# Patient Record
Sex: MALE | Race: WHITE | NOT HISPANIC OR LATINO | Employment: FULL TIME | URBAN - METROPOLITAN AREA
[De-identification: names, ages, dates, MRNs, and addresses within clinical notes are randomized per-mention and may not be internally consistent; named-entity substitution may affect disease eponyms.]

---

## 2023-10-18 DIAGNOSIS — Z00.00 LABORATORY TESTS ORDERED AS PART OF A COMPLETE PHYSICAL EXAM (CPE): ICD-10-CM

## 2023-11-16 ENCOUNTER — OFFICE VISIT (OUTPATIENT)
Dept: OPHTHALMOLOGY | Facility: CLINIC | Age: 63
End: 2023-11-16
Payer: COMMERCIAL

## 2023-11-16 DIAGNOSIS — H33.052 OLD RETINAL DETACHMENT, TOTAL OR SUBTOTAL, LEFT: ICD-10-CM

## 2023-11-16 DIAGNOSIS — H35.372 EPIRETINAL MEMBRANE (ERM) OF LEFT EYE: ICD-10-CM

## 2023-11-16 DIAGNOSIS — H40.1131 PRIMARY OPEN-ANGLE GLAUCOMA, BILATERAL, MILD STAGE: Primary | ICD-10-CM

## 2023-11-16 DIAGNOSIS — Z96.1 PSEUDOPHAKIA OF BOTH EYES: ICD-10-CM

## 2023-11-16 PROCEDURE — 99213 OFFICE O/P EST LOW 20 MIN: CPT | Performed by: OPHTHALMOLOGY

## 2023-11-16 ASSESSMENT — EXTERNAL EXAM - LEFT EYE: OS_EXAM: NORMAL

## 2023-11-16 ASSESSMENT — CONF VISUAL FIELD
OD_NORMAL: 1
OS_NORMAL: 1
OD_SUPERIOR_TEMPORAL_RESTRICTION: 0
OD_INFERIOR_TEMPORAL_RESTRICTION: 0
OD_INFERIOR_NASAL_RESTRICTION: 0
OS_INFERIOR_NASAL_RESTRICTION: 0
OD_SUPERIOR_NASAL_RESTRICTION: 0
OS_INFERIOR_TEMPORAL_RESTRICTION: 0
OS_SUPERIOR_TEMPORAL_RESTRICTION: 0
OS_SUPERIOR_NASAL_RESTRICTION: 0

## 2023-11-16 ASSESSMENT — VISUAL ACUITY
OS_PH_SC: 20/70
OS_SC: 20/200
METHOD: SNELLEN - LINEAR
OD_SC: 20/20

## 2023-11-16 ASSESSMENT — SLIT LAMP EXAM - LIDS
COMMENTS: NORMAL
COMMENTS: NORMAL

## 2023-11-16 ASSESSMENT — ENCOUNTER SYMPTOMS: EYES NEGATIVE: 1

## 2023-11-16 ASSESSMENT — CUP TO DISC RATIO
OD_RATIO: 0.85
OS_RATIO: 0.9

## 2023-11-16 ASSESSMENT — TONOMETRY
IOP_METHOD: GOLDMANN APPLANATION
OS_IOP_MMHG: 10
OD_IOP_MMHG: 10

## 2023-11-16 ASSESSMENT — PACHYMETRY
OD_CT(UM): 559
OS_CT(UM): 599

## 2023-11-16 ASSESSMENT — EXTERNAL EXAM - RIGHT EYE: OD_EXAM: NORMAL

## 2023-11-16 NOTE — PROGRESS NOTES
Visual Acuity (Snellen - Linear)         Right Left    Dist sc 20/20 20/200    Dist ph sc  20/70          Tonometry       Tonometry (Goldmann Applanation, 8:58 AM)         Right Left    Pressure 10 10                  Assessment/Plan   Last dilated:  7/20/23    1.  Open-Angle Glaucoma OU:  /599  Tm 35/36.  Pt with 38 year history of glaucoma.  Pt had SLT OU with Dr. Galeana IOPs went higher.  I believed that IOP needed to be lower.  Brimonidine -> no effect, but rhopressa brought IOPs down from 15-17 mmHg down to 11 mmHg.  IOP is well controlled     Plan:  cont latanoprost OU QHS            cont dorzolamide/timolol OU BID            cont rhopressa OS QHS             f/u 4 month     2.  h/o retinal detachment OS:  ~ age 42 yo - occurred 1 day after cataract surgery     Plan:  monitor    3.  Epiretinal Membrane with SWR OS: s/p SBP OS.  likely contributory to patients symptoms, but I feel it is more glaucoma field loss that he is noticing.     Plan:  as per Dr. Louis     4.  Pseudophakia (PCIOL OD) / sulcus IOL OS:  no visually significant PCO     Plan:  monitor

## 2023-12-28 ENCOUNTER — LAB (OUTPATIENT)
Dept: LAB | Facility: LAB | Age: 63
End: 2023-12-28
Payer: COMMERCIAL

## 2023-12-28 DIAGNOSIS — Z00.00 LABORATORY TESTS ORDERED AS PART OF A COMPLETE PHYSICAL EXAM (CPE): ICD-10-CM

## 2023-12-28 LAB
25(OH)D3 SERPL-MCNC: 49 NG/ML (ref 30–100)
ALT SERPL W P-5'-P-CCNC: 59 U/L (ref 10–52)
ANION GAP SERPL CALC-SCNC: 13 MMOL/L (ref 10–20)
APPEARANCE UR: ABNORMAL
BILIRUB UR STRIP.AUTO-MCNC: NEGATIVE MG/DL
BUN SERPL-MCNC: 14 MG/DL (ref 6–23)
CALCIUM SERPL-MCNC: 10 MG/DL (ref 8.6–10.6)
CHLORIDE SERPL-SCNC: 105 MMOL/L (ref 98–107)
CHOLEST SERPL-MCNC: 159 MG/DL (ref 0–199)
CHOLESTEROL/HDL RATIO: 4.6
CO2 SERPL-SCNC: 26 MMOL/L (ref 21–32)
COLOR UR: ABNORMAL
CREAT SERPL-MCNC: 1.29 MG/DL (ref 0.5–1.3)
ERYTHROCYTE [DISTWIDTH] IN BLOOD BY AUTOMATED COUNT: 12.5 % (ref 11.5–14.5)
EST. AVERAGE GLUCOSE BLD GHB EST-MCNC: 126 MG/DL
GFR SERPL CREATININE-BSD FRML MDRD: 63 ML/MIN/1.73M*2
GLUCOSE SERPL-MCNC: 95 MG/DL (ref 74–99)
GLUCOSE UR STRIP.AUTO-MCNC: NEGATIVE MG/DL
HBA1C MFR BLD: 6 %
HCT VFR BLD AUTO: 47.5 % (ref 41–52)
HDLC SERPL-MCNC: 34.9 MG/DL
HGB BLD-MCNC: 15.8 G/DL (ref 13.5–17.5)
KETONES UR STRIP.AUTO-MCNC: NEGATIVE MG/DL
LDLC SERPL CALC-MCNC: 98 MG/DL
LEUKOCYTE ESTERASE UR QL STRIP.AUTO: NEGATIVE
MCH RBC QN AUTO: 31.7 PG (ref 26–34)
MCHC RBC AUTO-ENTMCNC: 33.3 G/DL (ref 32–36)
MCV RBC AUTO: 95 FL (ref 80–100)
MUCOUS THREADS #/AREA URNS AUTO: ABNORMAL /LPF
NITRITE UR QL STRIP.AUTO: NEGATIVE
NON HDL CHOLESTEROL: 124 MG/DL (ref 0–149)
NRBC BLD-RTO: 0 /100 WBCS (ref 0–0)
PH UR STRIP.AUTO: 5 [PH]
PLATELET # BLD AUTO: 260 X10*3/UL (ref 150–450)
POTASSIUM SERPL-SCNC: 4.7 MMOL/L (ref 3.5–5.3)
PROT UR STRIP.AUTO-MCNC: ABNORMAL MG/DL
PSA SERPL-MCNC: 3.18 NG/ML
RBC # BLD AUTO: 4.99 X10*6/UL (ref 4.5–5.9)
RBC # UR STRIP.AUTO: NEGATIVE /UL
RBC #/AREA URNS AUTO: ABNORMAL /HPF
SODIUM SERPL-SCNC: 139 MMOL/L (ref 136–145)
SP GR UR STRIP.AUTO: 1.02
TRIGL SERPL-MCNC: 131 MG/DL (ref 0–149)
TSH SERPL-ACNC: 1.02 MIU/L (ref 0.44–3.98)
UROBILINOGEN UR STRIP.AUTO-MCNC: 2 MG/DL
VIT B12 SERPL-MCNC: 631 PG/ML (ref 211–911)
VLDL: 26 MG/DL (ref 0–40)
WBC # BLD AUTO: 7.8 X10*3/UL (ref 4.4–11.3)
WBC #/AREA URNS AUTO: ABNORMAL /HPF

## 2023-12-28 PROCEDURE — 81001 URINALYSIS AUTO W/SCOPE: CPT

## 2023-12-28 PROCEDURE — 83036 HEMOGLOBIN GLYCOSYLATED A1C: CPT

## 2023-12-28 PROCEDURE — 36415 COLL VENOUS BLD VENIPUNCTURE: CPT

## 2023-12-28 PROCEDURE — 82306 VITAMIN D 25 HYDROXY: CPT

## 2023-12-28 PROCEDURE — 82607 VITAMIN B-12: CPT

## 2023-12-28 PROCEDURE — 80061 LIPID PANEL: CPT

## 2023-12-28 PROCEDURE — 84460 ALANINE AMINO (ALT) (SGPT): CPT

## 2023-12-28 PROCEDURE — 84443 ASSAY THYROID STIM HORMONE: CPT

## 2023-12-28 PROCEDURE — 84153 ASSAY OF PSA TOTAL: CPT

## 2023-12-28 PROCEDURE — 85027 COMPLETE CBC AUTOMATED: CPT

## 2023-12-28 PROCEDURE — 80048 BASIC METABOLIC PNL TOTAL CA: CPT

## 2023-12-29 DIAGNOSIS — H40.1131 PRIMARY OPEN-ANGLE GLAUCOMA, BILATERAL, MILD STAGE: Primary | ICD-10-CM

## 2023-12-29 RX ORDER — DORZOLAMIDE HYDROCHLORIDE AND TIMOLOL MALEATE 20; 5 MG/ML; MG/ML
SOLUTION/ DROPS OPHTHALMIC
COMMUNITY
Start: 2018-01-17 | End: 2023-12-29 | Stop reason: SDUPTHER

## 2023-12-29 RX ORDER — DORZOLAMIDE HYDROCHLORIDE AND TIMOLOL MALEATE 20; 5 MG/ML; MG/ML
SOLUTION/ DROPS OPHTHALMIC
Qty: 10 ML | Refills: 11 | Status: SHIPPED | OUTPATIENT
Start: 2023-12-29

## 2024-01-05 ENCOUNTER — OFFICE VISIT (OUTPATIENT)
Dept: PRIMARY CARE | Facility: CLINIC | Age: 64
End: 2024-01-05
Payer: COMMERCIAL

## 2024-01-05 VITALS
SYSTOLIC BLOOD PRESSURE: 122 MMHG | BODY MASS INDEX: 25.93 KG/M2 | OXYGEN SATURATION: 96 % | WEIGHT: 202 LBS | HEART RATE: 87 BPM | RESPIRATION RATE: 16 BRPM | HEIGHT: 74 IN | DIASTOLIC BLOOD PRESSURE: 88 MMHG | TEMPERATURE: 97.8 F

## 2024-01-05 DIAGNOSIS — Z00.00 WELL ADULT EXAM: Primary | ICD-10-CM

## 2024-01-05 PROCEDURE — 93000 ELECTROCARDIOGRAM COMPLETE: CPT | Performed by: INTERNAL MEDICINE

## 2024-01-05 PROCEDURE — 99396 PREV VISIT EST AGE 40-64: CPT | Performed by: INTERNAL MEDICINE

## 2024-01-05 PROCEDURE — 1036F TOBACCO NON-USER: CPT | Performed by: INTERNAL MEDICINE

## 2024-01-05 RX ORDER — LISINOPRIL 20 MG/1
20 TABLET ORAL DAILY
COMMUNITY
Start: 2023-04-27 | End: 2024-05-20 | Stop reason: SDUPTHER

## 2024-01-05 RX ORDER — METOPROLOL SUCCINATE 25 MG/1
1 TABLET, EXTENDED RELEASE ORAL DAILY
COMMUNITY
Start: 2017-01-12 | End: 2024-05-14

## 2024-01-05 RX ORDER — LATANOPROST 50 UG/ML
1 SOLUTION/ DROPS OPHTHALMIC DAILY
COMMUNITY
Start: 2022-12-16

## 2024-01-05 RX ORDER — AMLODIPINE BESYLATE 5 MG/1
5 TABLET ORAL DAILY
COMMUNITY
Start: 2022-08-15 | End: 2024-05-14

## 2024-01-05 RX ORDER — NETARSUDIL 0.2 MG/ML
1 SOLUTION/ DROPS OPHTHALMIC; TOPICAL DAILY
COMMUNITY
Start: 2022-08-26

## 2024-01-05 RX ORDER — DICLOFENAC SODIUM 75 MG/1
75 TABLET, DELAYED RELEASE ORAL 2 TIMES DAILY
COMMUNITY
Start: 2014-12-13

## 2024-01-05 RX ORDER — ATORVASTATIN CALCIUM 40 MG/1
40 TABLET, FILM COATED ORAL DAILY
COMMUNITY
Start: 2017-08-02 | End: 2024-05-14

## 2024-01-05 NOTE — PROGRESS NOTES
"Subjective   Patient ID: Justo Bhagat is a 63 y.o. male who presents for Annual Exam (Physical exam, left hip surgery clearance).  In for preop. For L hip replacement.        Review of Systems   All other systems reviewed and are negative.      Objective   Physical Exam  Constitutional:       Appearance: Normal appearance.   HENT:      Head: Normocephalic and atraumatic.      Nose: Nose normal.   Cardiovascular:      Rate and Rhythm: Normal rate and regular rhythm.   Abdominal:      General: Abdomen is flat.      Palpations: Abdomen is soft.   Musculoskeletal:         General: Normal range of motion.      Cervical back: Normal range of motion.   Skin:     General: Skin is warm and dry.   Neurological:      Mental Status: He is alert.       /88   Pulse 87   Temp 36.6 °C (97.8 °F)   Resp 16   Ht 1.88 m (6' 2\")   Wt 91.6 kg (202 lb)   SpO2 96%   BMI 25.94 kg/m²         Assessment/Plan   Problem List Items Addressed This Visit    None  Visit Diagnoses       Well adult exam    -  Primary    Relevant Orders    ECG 12 Lead        ASSESSMENT AND PLAN: Patient on examination is in fair health except for medical conditions discussed above, obtained screening blood tests to screen for high cholesterol, diabetes, thyroid, Ekg if above 50 years old or earlier if with cardiac history. Patient should be taking enough calcium in a balanced diet  Weight control especially if BMI is above ideal range. For Female Patients Only:  Mammogram yearly and PAP test with gynecology unless s/p Total hysterectomy  Bone density test at age 60 and above and every two years after that. Preventive Medicine: colon cancer screening by age 45 if no family history as well PSA @ 50 , balanced diet, and exercise as discussed. Seat belt use for injury prevention, living will. Substance use and /or tobacco use counseled when applicable. Alcohol use discussed, use designated . Immunizations TD age 50 and every 10 years. Pneumovax and " shingles vaccine counseled. Yearly flu vaccine unless contraindicated. More than 50% of office visit time spent counseling the patient, questions were answered. If problems arise, patient is to call or come back in. It is understood that the responsibility of healthcare is shared by the patient by following a healthy lifestyle and following the plan above as discussed. Advised complete physical examination every year. Pending additional results, may need to come for a return office visit for follow-up.       Proceed with surgery.

## 2024-05-14 DIAGNOSIS — E78.5 HYPERLIPIDEMIA, UNSPECIFIED: ICD-10-CM

## 2024-05-14 DIAGNOSIS — I10 ESSENTIAL (PRIMARY) HYPERTENSION: ICD-10-CM

## 2024-05-14 RX ORDER — ATORVASTATIN CALCIUM 40 MG/1
40 TABLET, FILM COATED ORAL DAILY
Qty: 90 TABLET | Refills: 3 | Status: SHIPPED | OUTPATIENT
Start: 2024-05-14 | End: 2024-05-20 | Stop reason: SDUPTHER

## 2024-05-14 RX ORDER — METOPROLOL SUCCINATE 25 MG/1
25 TABLET, EXTENDED RELEASE ORAL DAILY
Qty: 90 TABLET | Refills: 3 | Status: SHIPPED | OUTPATIENT
Start: 2024-05-14 | End: 2024-05-20 | Stop reason: SDUPTHER

## 2024-05-14 RX ORDER — AMLODIPINE BESYLATE 5 MG/1
5 TABLET ORAL DAILY
Qty: 90 TABLET | Refills: 3 | Status: SHIPPED | OUTPATIENT
Start: 2024-05-14 | End: 2024-05-20 | Stop reason: SDUPTHER

## 2024-05-20 DIAGNOSIS — I10 ESSENTIAL (PRIMARY) HYPERTENSION: Primary | ICD-10-CM

## 2024-05-20 DIAGNOSIS — E78.5 HYPERLIPIDEMIA, UNSPECIFIED: ICD-10-CM

## 2024-05-20 RX ORDER — AMLODIPINE BESYLATE 5 MG/1
5 TABLET ORAL DAILY
Qty: 90 TABLET | Refills: 3 | Status: SHIPPED | OUTPATIENT
Start: 2024-05-20

## 2024-05-20 RX ORDER — LISINOPRIL 20 MG/1
20 TABLET ORAL DAILY
Qty: 90 TABLET | Refills: 3 | Status: SHIPPED | OUTPATIENT
Start: 2024-05-20 | End: 2025-05-20

## 2024-05-20 RX ORDER — ATORVASTATIN CALCIUM 40 MG/1
40 TABLET, FILM COATED ORAL DAILY
Qty: 90 TABLET | Refills: 3 | Status: SHIPPED | OUTPATIENT
Start: 2024-05-20

## 2024-05-20 RX ORDER — METOPROLOL SUCCINATE 25 MG/1
25 TABLET, EXTENDED RELEASE ORAL DAILY
Qty: 90 TABLET | Refills: 3 | Status: SHIPPED | OUTPATIENT
Start: 2024-05-20

## 2024-07-01 ENCOUNTER — APPOINTMENT (OUTPATIENT)
Dept: CARDIOLOGY | Facility: CLINIC | Age: 64
End: 2024-07-01
Payer: COMMERCIAL

## 2024-07-15 DIAGNOSIS — H40.1131 PRIMARY OPEN-ANGLE GLAUCOMA, BILATERAL, MILD STAGE: ICD-10-CM

## 2024-07-15 RX ORDER — LATANOPROST 50 UG/ML
1 SOLUTION/ DROPS OPHTHALMIC DAILY
Qty: 7.5 ML | Refills: 3 | Status: SHIPPED | OUTPATIENT
Start: 2024-07-15

## 2024-07-15 RX ORDER — NETARSUDIL 0.2 MG/ML
1 SOLUTION/ DROPS OPHTHALMIC; TOPICAL DAILY
Qty: 7.5 ML | Refills: 3 | Status: SHIPPED | OUTPATIENT
Start: 2024-07-15

## 2024-07-15 RX ORDER — DORZOLAMIDE HYDROCHLORIDE AND TIMOLOL MALEATE 20; 5 MG/ML; MG/ML
SOLUTION/ DROPS OPHTHALMIC
Qty: 10 ML | Refills: 11 | Status: SHIPPED | OUTPATIENT
Start: 2024-07-15

## 2024-08-01 ENCOUNTER — APPOINTMENT (OUTPATIENT)
Dept: OPHTHALMOLOGY | Facility: CLINIC | Age: 64
End: 2024-08-01
Payer: COMMERCIAL

## 2024-09-04 ENCOUNTER — APPOINTMENT (OUTPATIENT)
Dept: CARDIOLOGY | Facility: CLINIC | Age: 64
End: 2024-09-04
Payer: COMMERCIAL

## 2024-10-30 PROBLEM — I10 BENIGN ESSENTIAL HYPERTENSION: Status: ACTIVE | Noted: 2024-10-30

## 2024-10-30 PROBLEM — H40.1132 PRIMARY OPEN ANGLE GLAUCOMA OF BOTH EYES, MODERATE STAGE: Status: ACTIVE | Noted: 2018-01-17

## 2024-10-30 PROBLEM — E78.5 HYPERLIPIDEMIA: Status: ACTIVE | Noted: 2024-10-30

## 2024-10-30 PROBLEM — M16.9 OSTEOARTHRITIS OF HIP: Status: ACTIVE | Noted: 2022-06-09

## 2024-10-30 PROBLEM — I25.10 CALCIFICATION OF CORONARY ARTERY: Status: ACTIVE | Noted: 2024-10-30

## 2024-10-31 ENCOUNTER — APPOINTMENT (OUTPATIENT)
Dept: OPHTHALMOLOGY | Facility: CLINIC | Age: 64
End: 2024-10-31
Payer: COMMERCIAL

## 2024-10-31 DIAGNOSIS — H40.1131 PRIMARY OPEN-ANGLE GLAUCOMA, BILATERAL, MILD STAGE: Primary | ICD-10-CM

## 2024-10-31 DIAGNOSIS — H40.10X0 OPEN-ANGLE GLAUCOMA OF BOTH EYES, UNSPECIFIED GLAUCOMA STAGE, UNSPECIFIED OPEN-ANGLE GLAUCOMA TYPE: ICD-10-CM

## 2024-10-31 PROCEDURE — 92133 CPTRZD OPH DX IMG PST SGM ON: CPT | Performed by: OPHTHALMOLOGY

## 2024-10-31 PROCEDURE — 99214 OFFICE O/P EST MOD 30 MIN: CPT | Performed by: OPHTHALMOLOGY

## 2024-10-31 PROCEDURE — 92083 EXTENDED VISUAL FIELD XM: CPT | Performed by: OPHTHALMOLOGY

## 2024-10-31 ASSESSMENT — VISUAL ACUITY
OS_PH_SC+: +2
OS_SC: 20/100
OS_PH_SC: 20/60
METHOD: SNELLEN - LINEAR
OD_SC: 20/20

## 2024-10-31 ASSESSMENT — TONOMETRY
IOP_METHOD: GOLDMANN APPLANATION
OD_IOP_MMHG: 11
OS_IOP_MMHG: 12

## 2024-10-31 ASSESSMENT — SLIT LAMP EXAM - LIDS
COMMENTS: NORMAL
COMMENTS: NORMAL

## 2024-10-31 ASSESSMENT — CUP TO DISC RATIO
OS_RATIO: 0.9
OD_RATIO: 0.85

## 2024-10-31 ASSESSMENT — PACHYMETRY
OS_CT(UM): 599
OD_CT(UM): 559

## 2024-10-31 ASSESSMENT — EXTERNAL EXAM - LEFT EYE: OS_EXAM: NORMAL

## 2024-10-31 ASSESSMENT — CONF VISUAL FIELD
OS_SUPERIOR_TEMPORAL_RESTRICTION: 0
OS_INFERIOR_NASAL_RESTRICTION: 0
OS_NORMAL: 1
OS_SUPERIOR_NASAL_RESTRICTION: 0
OS_INFERIOR_TEMPORAL_RESTRICTION: 0

## 2024-10-31 ASSESSMENT — EXTERNAL EXAM - RIGHT EYE: OD_EXAM: NORMAL

## 2024-11-18 ENCOUNTER — APPOINTMENT (OUTPATIENT)
Dept: CARDIOLOGY | Facility: CLINIC | Age: 64
End: 2024-11-18
Payer: COMMERCIAL

## 2024-11-18 VITALS
SYSTOLIC BLOOD PRESSURE: 122 MMHG | BODY MASS INDEX: 25.55 KG/M2 | OXYGEN SATURATION: 94 % | DIASTOLIC BLOOD PRESSURE: 76 MMHG | WEIGHT: 199 LBS | HEART RATE: 59 BPM | RESPIRATION RATE: 16 BRPM

## 2024-11-18 DIAGNOSIS — R00.2 PALPITATIONS: ICD-10-CM

## 2024-11-18 DIAGNOSIS — I10 BENIGN ESSENTIAL HYPERTENSION: Primary | ICD-10-CM

## 2024-11-18 DIAGNOSIS — I25.10 CALCIFICATION OF CORONARY ARTERY: ICD-10-CM

## 2024-11-18 PROCEDURE — 93000 ELECTROCARDIOGRAM COMPLETE: CPT | Performed by: STUDENT IN AN ORGANIZED HEALTH CARE EDUCATION/TRAINING PROGRAM

## 2024-11-18 PROCEDURE — 3078F DIAST BP <80 MM HG: CPT | Performed by: STUDENT IN AN ORGANIZED HEALTH CARE EDUCATION/TRAINING PROGRAM

## 2024-11-18 PROCEDURE — 99213 OFFICE O/P EST LOW 20 MIN: CPT | Performed by: STUDENT IN AN ORGANIZED HEALTH CARE EDUCATION/TRAINING PROGRAM

## 2024-11-18 PROCEDURE — 3074F SYST BP LT 130 MM HG: CPT | Performed by: STUDENT IN AN ORGANIZED HEALTH CARE EDUCATION/TRAINING PROGRAM

## 2024-11-18 RX ORDER — NAPROXEN SODIUM 220 MG/1
81 TABLET, FILM COATED ORAL DAILY
Qty: 90 TABLET | Refills: 3 | Status: SHIPPED | OUTPATIENT
Start: 2024-11-18 | End: 2025-11-18

## 2024-11-18 NOTE — PROGRESS NOTES
Encounter Date: 2023     Signed             Diagnoses/Problems  Assessed    · CAD (coronary artery disease) (414.00) (I25.10)   · Benign essential HTN (401.1) (I10)   · Hypercholesterolemia (272.0) (E78.00)     Orders  Benign essential HTN    · Renew: amLODIPine Besylate 5 MG Oral Tablet; Take 1 tablet daily   · Renew: Lisinopril 20 MG Oral Tablet; TAKE 1 TABLET BY MOUTH DAILY   · Renew: Metoprolol Succinate ER 25 MG Oral Tablet Extended Release 24 Hour; TAKE 1  TABLET BY MOUTH DAILY  Benign essential HTN, CAD (coronary artery disease), Hypercholesterolemia    · Comprehensive Metabolic Panel; Status:Active - Retrospective By Protocol  Authorization; Requested for:2023;    · Lipid Panel; Status:Active - Retrospective By Protocol Authorization; Requested  for:2023;    · Magnesium, Serum; Status:Active - Retrospective By Protocol Authorization; Requested  for:2023;   CAD (coronary artery disease)    · IO EKG Electrocardiogram- 12 Lead; Status:Complete;   Done: 2023  HLD (hyperlipidemia)    · Renew: Atorvastatin Calcium 40 MG Oral Tablet; TAKE 1 TABLET DAILY     Chief Complaint        THIERRY GU is being seen for an annual follow-up of.      Patient present today for medication review.      History of Present IllnessMay 2020  Patient is a 59-year-old male who is here to establish care and had coronary artery calcium score done which came back elevated.     Patient ports not having chest pain or shortness of breath or palpitation. He has family history for coronary artery disease with his dad having MI at age 55 who eventually  when he was 76. Patient denies smoking history. He reports walking at least 4 times per week regularly. His EKG today shows normal sinus rhythm with a rate of 60 with no significant ST-T changes. His coronary artery calcium score that was done in 2020 showed total coronary artery calcium score of 264 which was all in LAD. This is consistent with moderate  increase in the level of coronary artery calcium score. Patient past medical history is also pertinent for hypertension and hyperlipidemia. He has not done recent lipid check for at least 2 years.     August 13, 2020  Patient underwent exercise a stress test on June 29, 2020. He was able to exercise to a stage IV Bert protocol for total of 10 minutes and 35 seconds and achieved 9.7 METS. Patient developed shortness of breath and fatigued during the stress test that is relieved by rest. He was also hypertensive during the stress test up to 172/91 in phase 1 recovery. Patient reached 83% of the age-predicted target heart rate and therefore nondiagnostic his stress test as he did not reached the target heart rate. No EKG evidence for ischemia at submaximal workload. The myocardial perfusion imaging part of the study did not show evidence of ischemia and ejection fraction was reported 62%. He had labs done in May 2020 that showed creatinine of 1, potassium 4.4, LDL 96, magnesium 2.5.     May 19, 2022  Patient denies having chest pain or shortness of breath. He currently reports having some palpitation and is keep eating especially when he has more activity or he is anxious. His EKG today shows sinus rhythm with rate of 70. No significant ST-T changes. He has stopped taking his baby aspirin because of what he heard in the news. His father had coronary artery bypass grafting in his 70s.        May 24, 2023  Denies having chest pain or shortness of breath or palpitation. He is not taking any ACE inhibitor at this point. EKG today shows sinus rhythm with rate of 75 with no significant ST-T changes. Weight is up about 6 pounds.  His labs from June 2022 showed potassium 4.3, creatinine 1.1, LDL 82.                    Follow up visit    11/18/24    Justo Bhagat is a 63 y.o. male who is here for annual follow-up  Had hip surgery and eye surgery.      Patient denies any chest pain, shortness of breath, palpitation.    EKG today  shows normal sinus rhythm with normal axis and no significant ST-T changes.  Heart rate at 59.      Past Medical History  Past Medical History:   Diagnosis Date    Atherosclerotic heart disease of native coronary artery without angina pectoris 05/19/2022    CAD (coronary artery disease)    Essential (primary) hypertension 05/19/2022    Benign essential HTN    Hyperlipidemia, unspecified 05/19/2022    HLD (hyperlipidemia)    Personal history of other medical treatment     History of nuclear stress test    Primary open-angle glaucoma, bilateral, moderate stage 12/19/2022    Primary open angle glaucoma of both eyes, moderate stage        Past Surgical History  Past Surgical History:   Procedure Laterality Date    HERNIA REPAIR  05/21/2020    Inguinal Hernia Repair    OTHER SURGICAL HISTORY  05/21/2020    Laminectomy Decompressive Up To Two Lumbar Segments    OTHER SURGICAL HISTORY  05/21/2020    Cataract surgery    TOTAL HIP ARTHROPLASTY  05/21/2020    Total Hip Replacement        Social history  Social History     Socioeconomic History    Marital status:      Spouse name: Not on file    Number of children: Not on file    Years of education: Not on file    Highest education level: Not on file   Occupational History    Not on file   Tobacco Use    Smoking status: Never     Passive exposure: Never    Smokeless tobacco: Never   Substance and Sexual Activity    Alcohol use: Never    Drug use: Never    Sexual activity: Not on file   Other Topics Concern    Not on file   Social History Narrative    Not on file     Social Drivers of Health     Financial Resource Strain: Not on file   Food Insecurity: Not on file   Transportation Needs: Not on file   Physical Activity: Not on file   Stress: Not on file   Social Connections: Not on file   Intimate Partner Violence: Not on file   Housing Stability: Not on file        Family History  No family history on file.     12 system point of review is negative except for what  "described in history of present illness    Allergies:  No Known Allergies     Outpatient Medications:  Current Outpatient Medications   Medication Instructions    amLODIPine (NORVASC) 5 mg, oral, Daily    atorvastatin (LIPITOR) 40 mg, oral, Daily    diclofenac (VOLTAREN) 75 mg, 2 times daily    dorzolamide-timoloL (Cosopt) 22.3-6.8 mg/mL ophthalmic solution INSTILL 1 DROP IN BOTH EYES TWICE DAILY    latanoprost (Xalatan) 0.005 % ophthalmic solution 1 drop, Both Eyes, Daily    lisinopril 20 mg, oral, Daily    metoprolol succinate XL (TOPROL-XL) 25 mg, oral, Daily    Rhopressa 0.02 % drops opthalmic solution 1 drop, Left Eye, Daily         Last Recorded Vitals:      5/21/2020    10:02 AM 8/13/2020    10:03 AM 5/19/2022     3:43 PM 5/24/2023     9:11 AM 1/5/2024     8:18 AM 11/18/2024    11:19 AM   Vitals   Systolic 126 130 126 142 122 122   Diastolic 90 77 96 86 88 76   Heart Rate 60 80 74 74 87 59   Temp     36.6 °C (97.8 °F)    Resp    16 16 16   Height (in) 1.88 m (6' 2\")  1.88 m (6' 2\") 1.88 m (6' 2\") 1.88 m (6' 2\")    Weight (lb) 193.25  202.5 208 202 199   BMI 24.81 kg/m2  26 kg/m2 26.71 kg/m2 25.94 kg/m2 25.55 kg/m2   BSA (m2) 2.14 m2  2.19 m2 2.22 m2 2.19 m2 2.17 m2   Visit Report     Report Report    Visit Vitals  /76   Pulse 59   Resp 16   Wt 90.3 kg (199 lb)   SpO2 94%   BMI 25.55 kg/m²   Smoking Status Never   BSA 2.17 m²        Physical Exam:    General: Awake, alert/oriented x3, well developed, no acute distress  Head: Atraumatic/Normocephalic  Eyes: Normal external exam, EOMI, PERRLA  ENT: Oropharynx normal, moist mucous membranes  Cardiovascular: RRR, S1/S2, no murmurs, rubs, or gallops, radial pulses +2, no edema of extremities  Pulmonary: CTAB, no respiratory distress. No wheezes, rales, or ronchi  Abdomen: +BS, soft, non-tender, nondistended, no guarding or rebound  MSK: No joint swelling, normal movements of all extremities. Range of motion- normal.  Extremities: no edema, no cyanosis  Neuro: " "Alert/oriented x3, no focal motor or sensory deficits  Psychiatric: Judgment intact. Appropriate mood and behavior      I reviewed recent available cardiac studies.      Labs    Lab Results   Component Value Date    WBC 7.8 12/28/2023    HGB 15.8 12/28/2023    HCT 47.5 12/28/2023     12/28/2023    CHOL 159 12/28/2023    TRIG 131 12/28/2023    HDL 34.9 12/28/2023    ALT 59 (H) 12/28/2023    AST 19 06/03/2022     12/28/2023    K 4.7 12/28/2023     12/28/2023    CREATININE 1.29 12/28/2023    BUN 14 12/28/2023    CO2 26 12/28/2023    TSH 1.02 12/28/2023    PSA 3.18 12/28/2023    HGBA1C 6.0 (H) 12/28/2023     No results found for: \"CKTOTAL\", \"CKMB\", \"CKMBINDEX\", \"TROPONINI\"     No results found for: \"INR\", \"PROTIME\"          Assessment/Plan     Patient overall is doing well from a cardiac standpoint.    We will continue with current medications.    Discussed the importance of heart healthy diet and exercise.    Follow-up in clinic in 1 year with prior labs including CMP, Mg, lipids and EKG at the time of visit.           Pete Fletcher MD, PhD, [facility], [facility]  Interventional Cardiology, [facility]  Associate Professor of Medicine, [facility]  Office: [phone]         **Disclaimer: This note was dictated by speech recognition, and every effort has been made to prevent any error in transcription, however minor errors may be present**    "

## 2025-03-06 ENCOUNTER — APPOINTMENT (OUTPATIENT)
Dept: OPHTHALMOLOGY | Facility: CLINIC | Age: 65
End: 2025-03-06
Payer: COMMERCIAL

## 2025-03-06 DIAGNOSIS — H40.1131 PRIMARY OPEN-ANGLE GLAUCOMA, BILATERAL, MILD STAGE: ICD-10-CM

## 2025-03-06 PROCEDURE — 92020 GONIOSCOPY: CPT | Performed by: OPHTHALMOLOGY

## 2025-03-06 PROCEDURE — 99213 OFFICE O/P EST LOW 20 MIN: CPT | Performed by: OPHTHALMOLOGY

## 2025-03-06 RX ORDER — DORZOLAMIDE HYDROCHLORIDE AND TIMOLOL MALEATE 20; 5 MG/ML; MG/ML
SOLUTION/ DROPS OPHTHALMIC
Qty: 10 ML | Refills: 11 | Status: SHIPPED | OUTPATIENT
Start: 2025-03-06

## 2025-03-06 RX ORDER — LATANOPROST 50 UG/ML
1 SOLUTION/ DROPS OPHTHALMIC DAILY
Qty: 7.5 ML | Refills: 3 | Status: SHIPPED | OUTPATIENT
Start: 2025-03-06

## 2025-03-06 RX ORDER — NETARSUDIL 0.2 MG/ML
1 SOLUTION/ DROPS OPHTHALMIC; TOPICAL DAILY
Qty: 7.5 ML | Refills: 3 | Status: SHIPPED | OUTPATIENT
Start: 2025-03-06

## 2025-03-06 ASSESSMENT — CUP TO DISC RATIO
OS_RATIO: 0.9
OD_RATIO: 0.85

## 2025-03-06 ASSESSMENT — SLIT LAMP EXAM - LIDS
COMMENTS: NORMAL
COMMENTS: NORMAL

## 2025-03-06 ASSESSMENT — GONIOSCOPY
OD_TEMPORAL: D45R 1+ PTM
OS_SUPERIOR: D45R 1+ PTM
OD_INFERIOR: D45R 1+ PTM
OD_NASAL: D45R 1+ PTM
OS_INFERIOR: D45R 1+ PTM
OS_TEMPORAL: D45R 1+ PTM
OD_SUPERIOR: D45R 1+ PTM
OS_NASAL: D45R 1+ PTM

## 2025-03-06 ASSESSMENT — PACHYMETRY
OD_CT(UM): 559
OS_CT(UM): 599

## 2025-03-06 ASSESSMENT — EXTERNAL EXAM - RIGHT EYE: OD_EXAM: NORMAL

## 2025-03-06 ASSESSMENT — VISUAL ACUITY
OD_SC: 20/20
OS_PH_SC: 20/100
OS_SC+: +2
OS_SC: 20/200
METHOD: SNELLEN - LINEAR

## 2025-03-06 ASSESSMENT — TONOMETRY
OD_IOP_MMHG: 11
OS_IOP_MMHG: 12
IOP_METHOD: GOLDMANN APPLANATION

## 2025-03-06 ASSESSMENT — EXTERNAL EXAM - LEFT EYE: OS_EXAM: NORMAL

## 2025-03-06 NOTE — PROGRESS NOTES
Visual Acuity (Snellen - Linear)         Right Left    Dist sc 20/20 20/200 +2    Dist ph sc  20/100          Tonometry       Tonometry (Goldmann Applanation, 8:12 AM)         Right Left    Pressure 11 12                  Assessment/Plan   Last dilated:  10/31/24  Last gonio 3/6/25 OD:  D45r 1+ PTM    OS:  D45r 1+ PTM    1.  Open-Angle Glaucoma OU:  /599  Tm 35/36.  Pt with 38 year history of glaucoma.  Pt had SLT OU with Dr. Galeana IOPs went higher.  I believed that IOP needed to be lower.  Brimonidine -> no effect, but rhopressa brought IOPs down from 15-17 mmHg down to 11 mmHg.  IOP is well controlled     Plan:  cont latanoprost OU QHS            cont dorzolamide/timolol OU BID            cont rhopressa OS QHS             f/u 4 month     2.  h/o retinal detachment OS:  ~ age 42 yo - occurred 1 day after cataract surgery     Plan:  monitor    3.  Epiretinal Membrane with SWR OS: s/p SBP OS.  likely contributory to patients symptoms, but I feel it is more glaucoma field loss that he is noticing.     Plan:  as per Dr. Louis     4.  Pseudophakia (PCIOL OD) / sulcus IOL OS:  no visually significant PCO     Plan:  monitor

## 2025-05-15 ENCOUNTER — OFFICE VISIT (OUTPATIENT)
Dept: PRIMARY CARE | Facility: CLINIC | Age: 65
End: 2025-05-15
Payer: COMMERCIAL

## 2025-05-15 VITALS
DIASTOLIC BLOOD PRESSURE: 82 MMHG | WEIGHT: 197 LBS | HEART RATE: 60 BPM | TEMPERATURE: 97 F | OXYGEN SATURATION: 100 % | SYSTOLIC BLOOD PRESSURE: 132 MMHG | BODY MASS INDEX: 25.29 KG/M2

## 2025-05-15 DIAGNOSIS — T73.2XXS FATIGUE DUE TO EXPOSURE, SEQUELA: ICD-10-CM

## 2025-05-15 DIAGNOSIS — Z13.6 SCREENING FOR CARDIOVASCULAR CONDITION: Primary | ICD-10-CM

## 2025-05-15 DIAGNOSIS — Z12.5 SCREENING FOR PROSTATE CANCER: ICD-10-CM

## 2025-05-15 DIAGNOSIS — Z13.29 SCREENING FOR ENDOCRINE/METABOLIC/IMMUNITY DISORDERS: ICD-10-CM

## 2025-05-15 DIAGNOSIS — Z00.00 WELL ADULT EXAM: ICD-10-CM

## 2025-05-15 DIAGNOSIS — Z13.0 SCREENING FOR ENDOCRINE/METABOLIC/IMMUNITY DISORDERS: ICD-10-CM

## 2025-05-15 DIAGNOSIS — Z13.228 SCREENING FOR ENDOCRINE/METABOLIC/IMMUNITY DISORDERS: ICD-10-CM

## 2025-05-15 ASSESSMENT — PATIENT HEALTH QUESTIONNAIRE - PHQ9
SUM OF ALL RESPONSES TO PHQ QUESTIONS 1-9: 12
6. FEELING BAD ABOUT YOURSELF - OR THAT YOU ARE A FAILURE OR HAVE LET YOURSELF OR YOUR FAMILY DOWN: NOT AT ALL
SUM OF ALL RESPONSES TO PHQ9 QUESTIONS 1 AND 2: 4
7. TROUBLE CONCENTRATING ON THINGS, SUCH AS READING THE NEWSPAPER OR WATCHING TELEVISION: SEVERAL DAYS
9. THOUGHTS THAT YOU WOULD BE BETTER OFF DEAD, OR OF HURTING YOURSELF: NOT AT ALL
2. FEELING DOWN, DEPRESSED OR HOPELESS: SEVERAL DAYS
10. IF YOU CHECKED OFF ANY PROBLEMS, HOW DIFFICULT HAVE THESE PROBLEMS MADE IT FOR YOU TO DO YOUR WORK, TAKE CARE OF THINGS AT HOME, OR GET ALONG WITH OTHER PEOPLE: NOT DIFFICULT AT ALL
4. FEELING TIRED OR HAVING LITTLE ENERGY: NEARLY EVERY DAY
5. POOR APPETITE OR OVEREATING: NOT AT ALL
1. LITTLE INTEREST OR PLEASURE IN DOING THINGS: NEARLY EVERY DAY
3. TROUBLE FALLING OR STAYING ASLEEP OR SLEEPING TOO MUCH: NEARLY EVERY DAY
8. MOVING OR SPEAKING SO SLOWLY THAT OTHER PEOPLE COULD HAVE NOTICED. OR THE OPPOSITE, BEING SO FIGETY OR RESTLESS THAT YOU HAVE BEEN MOVING AROUND A LOT MORE THAN USUAL: SEVERAL DAYS

## 2025-05-15 ASSESSMENT — ANXIETY QUESTIONNAIRES
2. NOT BEING ABLE TO STOP OR CONTROL WORRYING: NOT AT ALL
7. FEELING AFRAID AS IF SOMETHING AWFUL MIGHT HAPPEN: SEVERAL DAYS
3. WORRYING TOO MUCH ABOUT DIFFERENT THINGS: MORE THAN HALF THE DAYS
5. BEING SO RESTLESS THAT IT IS HARD TO SIT STILL: NOT AT ALL
4. TROUBLE RELAXING: NOT AT ALL
IF YOU CHECKED OFF ANY PROBLEMS ON THIS QUESTIONNAIRE, HOW DIFFICULT HAVE THESE PROBLEMS MADE IT FOR YOU TO DO YOUR WORK, TAKE CARE OF THINGS AT HOME, OR GET ALONG WITH OTHER PEOPLE: NOT DIFFICULT AT ALL
1. FEELING NERVOUS, ANXIOUS, OR ON EDGE: NOT AT ALL
GAD7 TOTAL SCORE: 5
6. BECOMING EASILY ANNOYED OR IRRITABLE: MORE THAN HALF THE DAYS

## 2025-05-15 NOTE — PROGRESS NOTES
Subjective   Patient ID: Justo Bhagat is a 64 y.o. male who presents for No chief complaint on file..    HPI    Review of Systems    Previous history  Medical History[1]  Surgical History[2]  Social History[3]  Family History[4]  Allergies[5]  Current Outpatient Medications   Medication Instructions    amLODIPine (NORVASC) 5 mg, oral, Daily    aspirin 81 mg, oral, Daily    atorvastatin (LIPITOR) 40 mg, oral, Daily    diclofenac (VOLTAREN) 75 mg, 2 times daily    dorzolamide-timoloL (Cosopt) 22.3-6.8 mg/mL ophthalmic solution INSTILL 1 DROP IN BOTH EYES TWICE DAILY    latanoprost (Xalatan) 0.005 % ophthalmic solution 1 drop, Both Eyes, Daily    lisinopril 20 mg, oral, Daily    metoprolol succinate XL (TOPROL-XL) 25 mg, oral, Daily    Rhopressa 0.02 % drops opthalmic solution 1 drop, Left Eye, Daily       Objective       Physical Exam      Assessment/Plan   Justo Bhagat is a 64 y.o. male who presents for the concerns below:    Assessment & Plan  Screening for cardiovascular condition    Orders:    Alanine Aminotransferase; Future    Basic Metabolic Panel; Future    CBC; Future    Lipid Panel; Future    Screening for endocrine/metabolic/immunity disorders    Orders:    TSH with reflex to Free T4 if abnormal; Future    Screening for prostate cancer    Orders:    Prostate Specific Antigen, Screen; Future    Well adult exam         Fatigue due to exposure, sequela    Orders:    Hemoglobin A1c; Future     Fatigue: Will          Discussed with:   Return in :    Portions of this note were generated using digital voice recognition software, and may contain grammatical errors       Luis Loera MD  05/15/25  11:04 AM         [1]   Past Medical History:  Diagnosis Date    Atherosclerotic heart disease of native coronary artery without angina pectoris 05/19/2022    CAD (coronary artery disease)    Essential (primary) hypertension 05/19/2022    Benign essential HTN    Hyperlipidemia, unspecified 05/19/2022    HLD  (hyperlipidemia)    Personal history of other medical treatment     History of nuclear stress test    Primary open-angle glaucoma, bilateral, moderate stage 12/19/2022    Primary open angle glaucoma of both eyes, moderate stage   [2]   Past Surgical History:  Procedure Laterality Date    HERNIA REPAIR  05/21/2020    Inguinal Hernia Repair    OTHER SURGICAL HISTORY  05/21/2020    Laminectomy Decompressive Up To Two Lumbar Segments    OTHER SURGICAL HISTORY  05/21/2020    Cataract surgery    TOTAL HIP ARTHROPLASTY  05/21/2020    Total Hip Replacement   [3]   Social History  Tobacco Use    Smoking status: Never     Passive exposure: Never    Smokeless tobacco: Never   Substance Use Topics    Alcohol use: Never    Drug use: Never   [4] No family history on file.  [5] No Known Allergies

## 2025-05-16 LAB
ALT SERPL-CCNC: 15 U/L (ref 9–46)
ANION GAP SERPL CALCULATED.4IONS-SCNC: 9 MMOL/L (CALC) (ref 7–17)
BUN SERPL-MCNC: 14 MG/DL (ref 7–25)
BUN/CREAT SERPL: NORMAL (CALC) (ref 6–22)
CALCIUM SERPL-MCNC: 9.7 MG/DL (ref 8.6–10.3)
CHLORIDE SERPL-SCNC: 104 MMOL/L (ref 98–110)
CHOLEST SERPL-MCNC: 133 MG/DL
CHOLEST/HDLC SERPL: 3.6 (CALC)
CO2 SERPL-SCNC: 26 MMOL/L (ref 20–32)
CREAT SERPL-MCNC: 1.04 MG/DL (ref 0.7–1.35)
EGFRCR SERPLBLD CKD-EPI 2021: 81 ML/MIN/1.73M2
ERYTHROCYTE [DISTWIDTH] IN BLOOD BY AUTOMATED COUNT: 12.6 % (ref 11–15)
EST. AVERAGE GLUCOSE BLD GHB EST-MCNC: 126 MG/DL
EST. AVERAGE GLUCOSE BLD GHB EST-SCNC: 7 MMOL/L
GLUCOSE SERPL-MCNC: 96 MG/DL (ref 65–99)
HBA1C MFR BLD: 6 %
HCT VFR BLD AUTO: 48.6 % (ref 38.5–50)
HDLC SERPL-MCNC: 37 MG/DL
HGB BLD-MCNC: 15.8 G/DL (ref 13.2–17.1)
LDLC SERPL CALC-MCNC: 78 MG/DL (CALC)
MCH RBC QN AUTO: 31.5 PG (ref 27–33)
MCHC RBC AUTO-ENTMCNC: 32.5 G/DL (ref 32–36)
MCV RBC AUTO: 97 FL (ref 80–100)
NONHDLC SERPL-MCNC: 96 MG/DL (CALC)
PLATELET # BLD AUTO: 279 THOUSAND/UL (ref 140–400)
PMV BLD REES-ECKER: 10.2 FL (ref 7.5–12.5)
POTASSIUM SERPL-SCNC: 5.1 MMOL/L (ref 3.5–5.3)
PSA SERPL-MCNC: 3.57 NG/ML
RBC # BLD AUTO: 5.01 MILLION/UL (ref 4.2–5.8)
SODIUM SERPL-SCNC: 139 MMOL/L (ref 135–146)
TRIGL SERPL-MCNC: 93 MG/DL
TSH SERPL-ACNC: 1.08 MIU/L (ref 0.4–4.5)
WBC # BLD AUTO: 6.3 THOUSAND/UL (ref 3.8–10.8)

## 2025-05-23 DIAGNOSIS — E78.5 HYPERLIPIDEMIA, UNSPECIFIED: ICD-10-CM

## 2025-05-23 DIAGNOSIS — I10 ESSENTIAL (PRIMARY) HYPERTENSION: ICD-10-CM

## 2025-05-23 RX ORDER — METOPROLOL SUCCINATE 25 MG/1
25 TABLET, EXTENDED RELEASE ORAL DAILY
Qty: 90 TABLET | Refills: 3 | Status: CANCELLED | OUTPATIENT
Start: 2025-05-23 | End: 2026-05-23

## 2025-05-23 RX ORDER — LISINOPRIL 20 MG/1
20 TABLET ORAL DAILY
Qty: 90 TABLET | Refills: 3 | Status: CANCELLED | OUTPATIENT
Start: 2025-05-23 | End: 2026-05-23

## 2025-05-23 RX ORDER — METOPROLOL SUCCINATE 25 MG/1
25 TABLET, EXTENDED RELEASE ORAL DAILY
Qty: 30 TABLET | Refills: 6 | Status: SHIPPED | OUTPATIENT
Start: 2025-05-23

## 2025-05-23 RX ORDER — AMLODIPINE BESYLATE 5 MG/1
5 TABLET ORAL DAILY
Qty: 30 TABLET | Refills: 6 | Status: SHIPPED | OUTPATIENT
Start: 2025-05-23

## 2025-05-23 RX ORDER — AMLODIPINE BESYLATE 5 MG/1
5 TABLET ORAL DAILY
Qty: 90 TABLET | Refills: 3 | Status: CANCELLED | OUTPATIENT
Start: 2025-05-23 | End: 2026-05-23

## 2025-05-28 RX ORDER — ATORVASTATIN CALCIUM 40 MG/1
40 TABLET, FILM COATED ORAL DAILY
Qty: 30 TABLET | Refills: 6 | Status: SHIPPED | OUTPATIENT
Start: 2025-05-28

## 2025-06-19 DIAGNOSIS — I10 ESSENTIAL (PRIMARY) HYPERTENSION: ICD-10-CM

## 2025-06-19 RX ORDER — LISINOPRIL 20 MG/1
20 TABLET ORAL DAILY
Qty: 90 TABLET | Refills: 3 | Status: SHIPPED | OUTPATIENT
Start: 2025-06-19 | End: 2026-06-19

## 2025-06-25 DIAGNOSIS — E78.5 HYPERLIPIDEMIA, UNSPECIFIED HYPERLIPIDEMIA TYPE: ICD-10-CM

## 2025-06-25 DIAGNOSIS — R73.09 ELEVATED HEMOGLOBIN A1C: ICD-10-CM

## 2025-06-27 ENCOUNTER — OFFICE VISIT (OUTPATIENT)
Dept: PRIMARY CARE | Facility: CLINIC | Age: 65
End: 2025-06-27
Payer: COMMERCIAL

## 2025-06-27 VITALS
TEMPERATURE: 98.6 F | SYSTOLIC BLOOD PRESSURE: 122 MMHG | HEIGHT: 74 IN | BODY MASS INDEX: 25.54 KG/M2 | DIASTOLIC BLOOD PRESSURE: 80 MMHG | HEART RATE: 78 BPM | WEIGHT: 199 LBS | OXYGEN SATURATION: 97 %

## 2025-06-27 DIAGNOSIS — R53.83 OTHER FATIGUE: Primary | ICD-10-CM

## 2025-06-27 PROCEDURE — 1036F TOBACCO NON-USER: CPT | Performed by: INTERNAL MEDICINE

## 2025-06-27 PROCEDURE — 3079F DIAST BP 80-89 MM HG: CPT | Performed by: INTERNAL MEDICINE

## 2025-06-27 PROCEDURE — 99214 OFFICE O/P EST MOD 30 MIN: CPT | Performed by: INTERNAL MEDICINE

## 2025-06-27 PROCEDURE — 3008F BODY MASS INDEX DOCD: CPT | Performed by: INTERNAL MEDICINE

## 2025-06-27 PROCEDURE — 3074F SYST BP LT 130 MM HG: CPT | Performed by: INTERNAL MEDICINE

## 2025-06-27 PROCEDURE — 96372 THER/PROPH/DIAG INJ SC/IM: CPT | Performed by: INTERNAL MEDICINE

## 2025-06-27 RX ORDER — CYANOCOBALAMIN 1000 UG/ML
1000 INJECTION, SOLUTION INTRAMUSCULAR; SUBCUTANEOUS ONCE
Status: COMPLETED | OUTPATIENT
Start: 2025-06-27 | End: 2025-06-27

## 2025-06-27 RX ADMIN — CYANOCOBALAMIN 1000 MCG: 1000 INJECTION, SOLUTION INTRAMUSCULAR; SUBCUTANEOUS at 11:17

## 2025-06-27 ASSESSMENT — ENCOUNTER SYMPTOMS
ABDOMINAL PAIN: 1
FATIGUE: 1

## 2025-06-27 NOTE — PROGRESS NOTES
Subjective   Patient ID: Justo Bhagat is a 64 y.o. male who presents for Fatigue, Abdominal Pain (ii), and Immunizations.    In for fatigue,     Fatigue  Associated symptoms include abdominal pain and fatigue.   Abdominal Pain        Review of Systems   Constitutional:  Positive for fatigue.   HENT:  Sinus pressure: .jpend.    Gastrointestinal:  Positive for abdominal pain.       Previous history  Medical History[1]  Surgical History[2]  Social History[3]  Family History[4]  Allergies[5]  Current Outpatient Medications   Medication Instructions    amLODIPine (NORVASC) 5 mg, oral, Daily    aspirin 81 mg, oral, Daily    atorvastatin (LIPITOR) 40 mg, oral, Daily    dorzolamide-timoloL (Cosopt) 22.3-6.8 mg/mL ophthalmic solution INSTILL 1 DROP IN BOTH EYES TWICE DAILY    latanoprost (Xalatan) 0.005 % ophthalmic solution 1 drop, Both Eyes, Daily    lisinopril 20 mg, oral, Daily    metoprolol succinate XL (TOPROL-XL) 25 mg, oral, Daily    Rhopressa 0.02 % drops opthalmic solution 1 drop, Left Eye, Daily       Objective       Physical Exam      Assessment/Plan   Justo Bhagat is a 64 y.o. male who presents for the concerns below:    Assessment & Plan  Other fatigue    Orders:    Referral to Adult Sleep Medicine; Future              Discussed with:   Return in :    Portions of this note were generated using digital voice recognition software, and may contain grammatical errors       Luis Loera MD  06/27/25  11:00 AM         [1]   Past Medical History:  Diagnosis Date    Atherosclerotic heart disease of native coronary artery without angina pectoris 05/19/2022    CAD (coronary artery disease)    Essential (primary) hypertension 05/19/2022    Benign essential HTN    Hyperlipidemia, unspecified 05/19/2022    HLD (hyperlipidemia)    Personal history of other medical treatment     History of nuclear stress test    Primary open-angle glaucoma, bilateral, moderate stage 12/19/2022    Primary open angle glaucoma of both eyes,  moderate stage   [2]   Past Surgical History:  Procedure Laterality Date    HERNIA REPAIR  05/21/2020    Inguinal Hernia Repair    OTHER SURGICAL HISTORY  05/21/2020    Laminectomy Decompressive Up To Two Lumbar Segments    OTHER SURGICAL HISTORY  05/21/2020    Cataract surgery    TOTAL HIP ARTHROPLASTY  05/21/2020    Total Hip Replacement   [3]   Social History  Tobacco Use    Smoking status: Never     Passive exposure: Never    Smokeless tobacco: Never   Substance Use Topics    Alcohol use: Never    Drug use: Never   [4] No family history on file.  [5] No Known Allergies

## 2025-07-17 ENCOUNTER — APPOINTMENT (OUTPATIENT)
Dept: OPHTHALMOLOGY | Facility: CLINIC | Age: 65
End: 2025-07-17
Payer: COMMERCIAL

## 2025-09-11 ENCOUNTER — APPOINTMENT (OUTPATIENT)
Dept: SLEEP MEDICINE | Facility: CLINIC | Age: 65
End: 2025-09-11
Payer: COMMERCIAL